# Patient Record
Sex: MALE | Race: WHITE | NOT HISPANIC OR LATINO | Employment: FULL TIME | URBAN - METROPOLITAN AREA
[De-identification: names, ages, dates, MRNs, and addresses within clinical notes are randomized per-mention and may not be internally consistent; named-entity substitution may affect disease eponyms.]

---

## 2018-04-17 ENCOUNTER — HOSPITAL ENCOUNTER (EMERGENCY)
Facility: HOSPITAL | Age: 18
Discharge: HOME | End: 2018-04-17
Attending: EMERGENCY MEDICINE | Admitting: EMERGENCY MEDICINE

## 2018-04-17 VITALS
SYSTOLIC BLOOD PRESSURE: 147 MMHG | WEIGHT: 175 LBS | OXYGEN SATURATION: 99 % | TEMPERATURE: 97.9 F | HEART RATE: 89 BPM | BODY MASS INDEX: 25.92 KG/M2 | RESPIRATION RATE: 14 BRPM | DIASTOLIC BLOOD PRESSURE: 79 MMHG | HEIGHT: 69 IN

## 2018-04-17 DIAGNOSIS — K40.90 UNILATERAL INGUINAL HERNIA WITHOUT OBSTRUCTION OR GANGRENE, RECURRENCE NOT SPECIFIED: Primary | ICD-10-CM

## 2018-04-17 PROCEDURE — 99283 EMERGENCY DEPT VISIT LOW MDM: CPT

## 2018-04-17 NOTE — DISCHARGE INSTRUCTIONS
Follow up with one of the [facility] physician groups below to setup primary care. If you have trouble following up, please call Tsering Washington, our transitional care nurse, at [phone].    (Dr. Ingram, Dr. Sullivan, Dr. Chi, and Dr. Calix.)  [facility], Primary Care, [phone], [address], [place] [address]    [facility], Primary Care, [phone], [address], [address] [place], [address] [facility]) [facility] Medical [facility], Primary Care, [phone], [address], [address] [place], [address] [facility], Primary Care, [phone], [address], [address] [place], [address]     [place] [address] [facility], Primary Care, [phone], [address], [address] [place], [address]    [place] 2 [facility], Primary Care, [phone], [address] [place]    [place], [address] [facility], Primary Care, [phone], [address], [address] [place], [address] [facility], Primary Care, [phone], [address] [place], [address] [facility]) [facility] Medical [facility], Primary Care, [phone], [address] [place], [address] [facility], Primary Care, [phone], [address], [address] [place], [address] [facility], Primary Care, [phone], [address], [place], [address]     [facility], Primary Care, [phone], [address] [id number]

## 2018-04-17 NOTE — ED PROVIDER NOTES
Subjective   Right groin swelling x 1 day.     No pain. No fever. No urinary symptoms.    Works at a factory. Lifts heavy parts.        Abdominal Pain   Pain location:  RLQ (swollen area. NO pain.)  Pain radiates to:  Does not radiate  Pain severity:  No pain  Onset quality:  Sudden  Duration:  1 day  Timing:  Constant  Progression:  Unchanged  Chronicity:  New  Relieved by:  Nothing  Worsened by:  Nothing  Ineffective treatments:  None tried  Associated symptoms: no chest pain, no chills, no diarrhea, no dysuria, no fever, no hematuria, no nausea, no shortness of breath, no sore throat and no vomiting        Review of Systems   Constitutional: Negative for chills and fever.   HENT: Negative for congestion, nosebleeds and sore throat.    Eyes: Negative for photophobia and pain.   Respiratory: Negative for shortness of breath.    Cardiovascular: Negative for chest pain.   Gastrointestinal: Positive for abdominal pain. Negative for diarrhea, nausea and vomiting.   Endocrine: Negative for polyuria.   Genitourinary: Negative for difficulty urinating, dysuria, flank pain, frequency, hematuria and urgency.   Neurological: Negative for dizziness, tremors, seizures, syncope, facial asymmetry, speech difficulty, weakness, light-headedness, numbness and headaches.   Psychiatric/Behavioral: Negative.    All other systems reviewed and are negative.      History reviewed. No pertinent past medical history.    No Known Allergies    Past Surgical History:   Procedure Laterality Date   • KNEE SURGERY         History reviewed. No pertinent family history.    Social History     Social History   • Marital status: Single     Social History Main Topics   • Smoking status: Never Smoker   • Alcohol use No   • Drug use: Unknown   • Sexual activity: Defer     Other Topics Concern   • Not on file           Objective   Physical Exam   Constitutional: He is oriented to person, place, and time. He appears well-developed and well-nourished.    HENT:   Head: Normocephalic and atraumatic.   Right Ear: External ear normal.   Left Ear: External ear normal.   Nose: Nose normal.   Mouth/Throat: Oropharynx is clear and moist.   Eyes: Conjunctivae and EOM are normal. Pupils are equal, round, and reactive to light.   Neck: Normal range of motion. Neck supple.   Cardiovascular: Normal rate, regular rhythm, normal heart sounds and intact distal pulses.    Pulmonary/Chest: Effort normal and breath sounds normal.   Abdominal: Soft. Bowel sounds are normal. He exhibits mass. He exhibits no pulsatile midline mass. There is no tenderness.       Right soft area of swelling cw inguinal hernia. Reducible.    Musculoskeletal: Normal range of motion.   Neurological: He is alert and oriented to person, place, and time.   Skin: Skin is warm and dry.   Psychiatric: He has a normal mood and affect. His behavior is normal. Judgment normal.       Procedures         ED Course  ED Course   Comment By Time   Limited lifting. Support. Surgery fu. Well aware of the ss of worsening condition. All thankful and agreeable. MARYSOL Sweet 04/17 0979                  [facility]    Final diagnoses:   Unilateral inguinal hernia without obstruction or gangrene, recurrence not specified            MARYSOL Sweet  04/17/18 3438

## 2018-04-24 ENCOUNTER — HOSPITAL ENCOUNTER (EMERGENCY)
Facility: HOSPITAL | Age: 18
Discharge: HOME | End: 2018-04-24
Attending: EMERGENCY MEDICINE | Admitting: EMERGENCY MEDICINE

## 2018-04-24 VITALS
OXYGEN SATURATION: 100 % | RESPIRATION RATE: 16 BRPM | SYSTOLIC BLOOD PRESSURE: 138 MMHG | WEIGHT: 175 LBS | BODY MASS INDEX: 25.92 KG/M2 | HEIGHT: 69 IN | HEART RATE: 80 BPM | DIASTOLIC BLOOD PRESSURE: 74 MMHG | TEMPERATURE: 98.3 F

## 2018-04-24 DIAGNOSIS — K40.90 UNILATERAL INGUINAL HERNIA WITHOUT OBSTRUCTION OR GANGRENE, RECURRENCE NOT SPECIFIED: Primary | ICD-10-CM

## 2018-04-24 PROCEDURE — 99283 EMERGENCY DEPT VISIT LOW MDM: CPT

## 2018-04-24 RX ORDER — NAPROXEN 500 MG/1
500 TABLET ORAL 2 TIMES DAILY WITH MEALS
Qty: 10 TABLET | Refills: 0 | Status: SHIPPED | OUTPATIENT
Start: 2018-04-24

## 2018-04-24 NOTE — ED PROVIDER NOTES
Subjective   17-year-old white male complaining of right inguinal hernia.  Patient was seen on 4/17/2018 for the same complaint.  At that time a stable right inguinal hernia was diagnosed.  He states he does do some lifting at work and feels like he may have aggravated this at work.  He was referred to Dr. Ricci's office and then referred to Dr. Noel's office secondary to the type of issue that he had .  When he presented to Dr. Noel's office, he was told that his insurance wasn't accepted there.  Out of frustration, he was told to come to the emergency department.  Patient states that while his pain is somewhat worse the enlargement is the same and it is not larger than before.  Further he states that it does not state out and can go and.  He denies any vomiting, bowel issues, inability to pass gas or other complaints.        History provided by:  Patient  Abdominal Pain   Pain location: Right inguinal.  Pain quality: dull    Pain radiates to:  Does not radiate  Pain severity:  Mild  Onset quality:  Gradual  Timing:  Intermittent  Progression:  Worsening  Chronicity:  New  Relieved by:  Nothing  Worsened by:  Movement  Ineffective treatments:  None tried  Associated symptoms: no anorexia, no fever, no nausea and no vomiting        Review of Systems   Constitutional: Negative for fever.   Gastrointestinal: Positive for abdominal pain. Negative for anorexia, nausea and vomiting.   All other systems reviewed and are negative.      Past Medical History:   Diagnosis Date   • Inguinal hernia        No Known Allergies    Past Surgical History:   Procedure Laterality Date   • KNEE SURGERY         History reviewed. No pertinent family history.    Social History     Social History   • Marital status: Single     Social History Main Topics   • Smoking status: Never Smoker   • Smokeless tobacco: Never Used   • Alcohol use No   • Drug use: Unknown   • Sexual activity: Defer     Other Topics Concern   • Not on file            Objective   Physical Exam   Constitutional: He appears well-developed and well-nourished.   HENT:   Head: Normocephalic and atraumatic.   Eyes: Conjunctivae are normal.   Cardiovascular: Normal rate, regular rhythm and normal heart sounds.  Exam reveals no friction rub.    No murmur heard.  Pulmonary/Chest: Effort normal and breath sounds normal. No respiratory distress. He has no wheezes.   Abdominal: Soft. Bowel sounds are normal. He exhibits no distension and no mass.   Small enlargement in the right inguinal area on coughing.  This is a hernia that is reducible and actually returns to somewhat normal anatomy when no increase in abdominal pressure.  Testes down ×2 with no mass.  Abdomen otherwise nontender.   Musculoskeletal: Normal range of motion.   Neurological: He is alert.   Skin: Skin is warm and dry. Capillary refill takes less than 2 seconds.   Psychiatric: He has a normal mood and affect. His behavior is normal. Judgment and thought content normal.   Nursing note and vitals reviewed.      Procedures         ED Course  ED Course   Comment By Time   I spoke with Dr. Panda Cha.He explained the insurance issue in detail.  He states that the patient can follow-up with him in the office.I spoke with the patient and explained that this is a stable hernia that we will try anti-inflammatories for some temporary relief.  He is to return if symptoms worsen which she understands. COLLEEN Grossman 04/24 1051                  [facility]    Final diagnoses:   Unilateral inguinal hernia without obstruction or gangrene, recurrence not specified            COLLEEN Grossman  04/24/18 1056

## 2018-04-24 NOTE — DISCHARGE INSTRUCTIONS
Return if pain worsens, vomiting, or problems sooner.  Call Dr. Panda Cha's office in 3 weeks for an appointment.  Your insurance coverage should be accepted at that point.  Certainly return sooner if the above-stated symptoms occur.  Try to use supportive underwear.